# Patient Record
Sex: FEMALE | HISPANIC OR LATINO
[De-identification: names, ages, dates, MRNs, and addresses within clinical notes are randomized per-mention and may not be internally consistent; named-entity substitution may affect disease eponyms.]

---

## 2017-07-24 ENCOUNTER — RESULT REVIEW (OUTPATIENT)
Age: 27
End: 2017-07-24

## 2018-07-06 ENCOUNTER — OUTPATIENT (OUTPATIENT)
Dept: OUTPATIENT SERVICES | Facility: HOSPITAL | Age: 28
LOS: 1 days | Discharge: HOME | End: 2018-07-06

## 2018-07-06 DIAGNOSIS — Z79.899 OTHER LONG TERM (CURRENT) DRUG THERAPY: ICD-10-CM

## 2019-04-01 ENCOUNTER — EMERGENCY (EMERGENCY)
Facility: HOSPITAL | Age: 29
LOS: 0 days | Discharge: HOME | End: 2019-04-01
Attending: EMERGENCY MEDICINE | Admitting: EMERGENCY MEDICINE
Payer: MEDICAID

## 2019-04-01 VITALS
HEART RATE: 96 BPM | DIASTOLIC BLOOD PRESSURE: 66 MMHG | TEMPERATURE: 98 F | RESPIRATION RATE: 16 BRPM | SYSTOLIC BLOOD PRESSURE: 119 MMHG | OXYGEN SATURATION: 99 %

## 2019-04-01 VITALS — HEIGHT: 64 IN | WEIGHT: 128.09 LBS

## 2019-04-01 DIAGNOSIS — K52.9 NONINFECTIVE GASTROENTERITIS AND COLITIS, UNSPECIFIED: ICD-10-CM

## 2019-04-01 DIAGNOSIS — R10.31 RIGHT LOWER QUADRANT PAIN: ICD-10-CM

## 2019-04-01 DIAGNOSIS — R19.7 DIARRHEA, UNSPECIFIED: ICD-10-CM

## 2019-04-01 LAB
ALBUMIN SERPL ELPH-MCNC: 4.2 G/DL — SIGNIFICANT CHANGE UP (ref 3.3–5)
ALP SERPL-CCNC: 88 U/L — SIGNIFICANT CHANGE UP (ref 40–120)
ALT FLD-CCNC: 30 U/L — SIGNIFICANT CHANGE UP (ref 12–78)
ANION GAP SERPL CALC-SCNC: 6 MMOL/L — SIGNIFICANT CHANGE UP (ref 5–17)
APPEARANCE UR: CLEAR — SIGNIFICANT CHANGE UP
AST SERPL-CCNC: 23 U/L — SIGNIFICANT CHANGE UP (ref 15–37)
BACTERIA # UR AUTO: ABNORMAL
BASOPHILS # BLD AUTO: 0.01 K/UL — SIGNIFICANT CHANGE UP (ref 0–0.2)
BASOPHILS NFR BLD AUTO: 0.1 % — SIGNIFICANT CHANGE UP (ref 0–2)
BILIRUB SERPL-MCNC: 0.5 MG/DL — SIGNIFICANT CHANGE UP (ref 0.2–1.2)
BILIRUB UR-MCNC: ABNORMAL
BUN SERPL-MCNC: 12 MG/DL — SIGNIFICANT CHANGE UP (ref 7–23)
CALCIUM SERPL-MCNC: 8.8 MG/DL — SIGNIFICANT CHANGE UP (ref 8.5–10.1)
CHLORIDE SERPL-SCNC: 107 MMOL/L — SIGNIFICANT CHANGE UP (ref 96–108)
CO2 SERPL-SCNC: 23 MMOL/L — SIGNIFICANT CHANGE UP (ref 22–31)
COLOR SPEC: YELLOW — SIGNIFICANT CHANGE UP
COMMENT - URINE: SIGNIFICANT CHANGE UP
CREAT SERPL-MCNC: 0.88 MG/DL — SIGNIFICANT CHANGE UP (ref 0.5–1.3)
DIFF PNL FLD: ABNORMAL
EOSINOPHIL # BLD AUTO: 0.08 K/UL — SIGNIFICANT CHANGE UP (ref 0–0.5)
EOSINOPHIL NFR BLD AUTO: 1 % — SIGNIFICANT CHANGE UP (ref 0–6)
EPI CELLS # UR: SIGNIFICANT CHANGE UP
GLUCOSE SERPL-MCNC: 122 MG/DL — HIGH (ref 70–99)
GLUCOSE UR QL: NEGATIVE MG/DL — SIGNIFICANT CHANGE UP
HCT VFR BLD CALC: 47.8 % — HIGH (ref 34.5–45)
HGB BLD-MCNC: 15.7 G/DL — HIGH (ref 11.5–15.5)
HYALINE CASTS # UR AUTO: ABNORMAL /LPF
IMM GRANULOCYTES NFR BLD AUTO: 0.2 % — SIGNIFICANT CHANGE UP (ref 0–1.5)
KETONES UR-MCNC: ABNORMAL
LEUKOCYTE ESTERASE UR-ACNC: ABNORMAL
LYMPHOCYTES # BLD AUTO: 0.59 K/UL — LOW (ref 1–3.3)
LYMPHOCYTES # BLD AUTO: 7.1 % — LOW (ref 13–44)
MCHC RBC-ENTMCNC: 30.7 PG — SIGNIFICANT CHANGE UP (ref 27–34)
MCHC RBC-ENTMCNC: 32.8 GM/DL — SIGNIFICANT CHANGE UP (ref 32–36)
MCV RBC AUTO: 93.4 FL — SIGNIFICANT CHANGE UP (ref 80–100)
MONOCYTES # BLD AUTO: 0.6 K/UL — SIGNIFICANT CHANGE UP (ref 0–0.9)
MONOCYTES NFR BLD AUTO: 7.2 % — SIGNIFICANT CHANGE UP (ref 2–14)
NEUTROPHILS # BLD AUTO: 7 K/UL — SIGNIFICANT CHANGE UP (ref 1.8–7.4)
NEUTROPHILS NFR BLD AUTO: 84.4 % — HIGH (ref 43–77)
NITRITE UR-MCNC: NEGATIVE — SIGNIFICANT CHANGE UP
NRBC # BLD: 0 /100 WBCS — SIGNIFICANT CHANGE UP (ref 0–0)
PH UR: 6 — SIGNIFICANT CHANGE UP (ref 5–8)
PLATELET # BLD AUTO: 208 K/UL — SIGNIFICANT CHANGE UP (ref 150–400)
POTASSIUM SERPL-MCNC: 3.6 MMOL/L — SIGNIFICANT CHANGE UP (ref 3.5–5.3)
POTASSIUM SERPL-SCNC: 3.6 MMOL/L — SIGNIFICANT CHANGE UP (ref 3.5–5.3)
PROT SERPL-MCNC: 7.8 GM/DL — SIGNIFICANT CHANGE UP (ref 6–8.3)
PROT UR-MCNC: 30 MG/DL
RBC # BLD: 5.12 M/UL — SIGNIFICANT CHANGE UP (ref 3.8–5.2)
RBC # FLD: 12.7 % — SIGNIFICANT CHANGE UP (ref 10.3–14.5)
RBC CASTS # UR COMP ASSIST: SIGNIFICANT CHANGE UP /HPF (ref 0–4)
SODIUM SERPL-SCNC: 136 MMOL/L — SIGNIFICANT CHANGE UP (ref 135–145)
SP GR SPEC: 1.02 — SIGNIFICANT CHANGE UP (ref 1.01–1.02)
UROBILINOGEN FLD QL: 1 MG/DL
WBC # BLD: 8.3 K/UL — SIGNIFICANT CHANGE UP (ref 3.8–10.5)
WBC # FLD AUTO: 8.3 K/UL — SIGNIFICANT CHANGE UP (ref 3.8–10.5)
WBC UR QL: SIGNIFICANT CHANGE UP

## 2019-04-01 PROCEDURE — 74177 CT ABD & PELVIS W/CONTRAST: CPT | Mod: 26

## 2019-04-01 PROCEDURE — 99284 EMERGENCY DEPT VISIT MOD MDM: CPT

## 2019-04-01 RX ORDER — KETOROLAC TROMETHAMINE 30 MG/ML
30 SYRINGE (ML) INJECTION ONCE
Qty: 0 | Refills: 0 | Status: DISCONTINUED | OUTPATIENT
Start: 2019-04-01 | End: 2019-04-01

## 2019-04-01 RX ORDER — SODIUM CHLORIDE 9 MG/ML
1000 INJECTION INTRAMUSCULAR; INTRAVENOUS; SUBCUTANEOUS ONCE
Qty: 0 | Refills: 0 | Status: COMPLETED | OUTPATIENT
Start: 2019-04-01 | End: 2019-04-01

## 2019-04-01 RX ORDER — SODIUM CHLORIDE 9 MG/ML
3 INJECTION INTRAMUSCULAR; INTRAVENOUS; SUBCUTANEOUS ONCE
Qty: 0 | Refills: 0 | Status: COMPLETED | OUTPATIENT
Start: 2019-04-01 | End: 2019-04-01

## 2019-04-01 RX ADMIN — SODIUM CHLORIDE 3 MILLILITER(S): 9 INJECTION INTRAMUSCULAR; INTRAVENOUS; SUBCUTANEOUS at 13:07

## 2019-04-01 RX ADMIN — Medication 30 MILLIGRAM(S): at 13:06

## 2019-04-01 RX ADMIN — SODIUM CHLORIDE 1000 MILLILITER(S): 9 INJECTION INTRAMUSCULAR; INTRAVENOUS; SUBCUTANEOUS at 13:06

## 2019-04-01 NOTE — ED STATDOCS - OBJECTIVE STATEMENT
28 y/o female with no pertinent PMHx presents to the ED c/o intermittent RLQ abd pain, diarrhea that started yesterday. She states she has had her period for one month. she was unable to secure an appointment with a doctor until next month. Never smoker. NKDA. PMD- [facility].

## 2019-04-01 NOTE — ED STATDOCS - PROGRESS NOTE DETAILS
Patient seen and evaluated with ED attending at intake, ED attending note and orders reviewed, will continue with patient follow up and care -Katerin Urrutia PA-C  ID [id number].  Reviewed all results with patient.  +ilieitis with diarrhea.  Likely viral or food related.  She has not had this before.  REviewed symptom care at home as well as return precautions and GI f/u if no improvement.  Also discussed that patient has had menstrual bleeding on and off for about 1 month.  She does have an appointment with her doctor in May, she could not get in earlier.  No anemia on blood work.  ovaries and uterus are unremarkable on ct.  copy of results provided to patient.  -Katerin Urrutia PA-C

## 2019-04-01 NOTE — ED STATDOCS - CLINICAL SUMMARY MEDICAL DECISION MAKING FREE TEXT BOX
Plan labs, fluids, pain meds, and CT abd Plan labs, fluids, pain meds, and CT abd    ileitis on CT, likely infectious and the cause of her pain and diarrhea.  Reviewed symptom care at home and return precautions.

## 2019-04-01 NOTE — ED ADULT TRIAGE NOTE - CHIEF COMPLAINT QUOTE
patient reports subjective fever and right groin pain x 2 days, RLQ pain x 24 hours.  She states she has had her period for one month. she was unable to secure an appointment with a doctor until next month. denies N/V/D

## 2019-05-30 ENCOUNTER — RESULT REVIEW (OUTPATIENT)
Age: 29
End: 2019-05-30

## 2019-12-27 ENCOUNTER — TRANSCRIPTION ENCOUNTER (OUTPATIENT)
Age: 29
End: 2019-12-27

## 2020-07-03 ENCOUNTER — OUTPATIENT (OUTPATIENT)
Dept: OUTPATIENT SERVICES | Facility: HOSPITAL | Age: 30
LOS: 1 days | End: 2020-07-03
Payer: MEDICAID

## 2020-07-03 DIAGNOSIS — Z11.59 ENCOUNTER FOR SCREENING FOR OTHER VIRAL DISEASES: ICD-10-CM

## 2020-07-03 PROBLEM — Z78.9 OTHER SPECIFIED HEALTH STATUS: Chronic | Status: ACTIVE | Noted: 2019-04-01

## 2020-07-03 LAB — SARS-COV-2 RNA SPEC QL NAA+PROBE: SIGNIFICANT CHANGE UP

## 2020-07-03 PROCEDURE — U0003: CPT

## 2020-07-04 DIAGNOSIS — Z11.59 ENCOUNTER FOR SCREENING FOR OTHER VIRAL DISEASES: ICD-10-CM

## 2020-09-07 ENCOUNTER — RESULT REVIEW (OUTPATIENT)
Age: 30
End: 2020-09-07

## 2021-11-26 ENCOUNTER — RESULT REVIEW (OUTPATIENT)
Age: 31
End: 2021-11-26

## 2022-12-05 ENCOUNTER — RESULT REVIEW (OUTPATIENT)
Age: 32
End: 2022-12-05

## 2024-02-13 ENCOUNTER — NON-APPOINTMENT (OUTPATIENT)
Age: 34
End: 2024-02-13

## 2024-05-21 NOTE — ED STATDOCS - MUSCULOSKELETAL, MLM
Per CM, palliative consult no longer needed at this time and consult is being closed    Please re-consult palliative care if needed in the future   range of motion is not limited and there is no muscle tenderness.

## 2024-08-31 ENCOUNTER — EMERGENCY (EMERGENCY)
Facility: HOSPITAL | Age: 34
LOS: 0 days | Discharge: HOME | End: 2024-08-31
Attending: STUDENT IN AN ORGANIZED HEALTH CARE EDUCATION/TRAINING PROGRAM
Payer: MEDICAID

## 2024-08-31 VITALS
TEMPERATURE: 98 F | RESPIRATION RATE: 18 BRPM | DIASTOLIC BLOOD PRESSURE: 64 MMHG | HEART RATE: 66 BPM | SYSTOLIC BLOOD PRESSURE: 99 MMHG | OXYGEN SATURATION: 97 %

## 2024-08-31 VITALS
TEMPERATURE: 99 F | SYSTOLIC BLOOD PRESSURE: 106 MMHG | RESPIRATION RATE: 18 BRPM | WEIGHT: 166.67 LBS | HEART RATE: 87 BPM | DIASTOLIC BLOOD PRESSURE: 73 MMHG | OXYGEN SATURATION: 98 %

## 2024-08-31 DIAGNOSIS — R05.9 COUGH, UNSPECIFIED: ICD-10-CM

## 2024-08-31 DIAGNOSIS — Z20.822 CONTACT WITH AND (SUSPECTED) EXPOSURE TO COVID-19: ICD-10-CM

## 2024-08-31 DIAGNOSIS — R09.81 NASAL CONGESTION: ICD-10-CM

## 2024-08-31 DIAGNOSIS — B34.9 VIRAL INFECTION, UNSPECIFIED: ICD-10-CM

## 2024-08-31 DIAGNOSIS — Z97.5 PRESENCE OF (INTRAUTERINE) CONTRACEPTIVE DEVICE: ICD-10-CM

## 2024-08-31 LAB
FLUAV AG NPH QL: SIGNIFICANT CHANGE UP
FLUBV AG NPH QL: SIGNIFICANT CHANGE UP
RSV RNA NPH QL NAA+NON-PROBE: SIGNIFICANT CHANGE UP
S PYO AG SPEC QL IA: NEGATIVE — SIGNIFICANT CHANGE UP
SARS-COV-2 RNA SPEC QL NAA+PROBE: SIGNIFICANT CHANGE UP

## 2024-08-31 PROCEDURE — 93010 ELECTROCARDIOGRAM REPORT: CPT

## 2024-08-31 PROCEDURE — 99285 EMERGENCY DEPT VISIT HI MDM: CPT | Mod: 25

## 2024-08-31 PROCEDURE — 87880 STREP A ASSAY W/OPTIC: CPT

## 2024-08-31 PROCEDURE — 71046 X-RAY EXAM CHEST 2 VIEWS: CPT

## 2024-08-31 PROCEDURE — 87081 CULTURE SCREEN ONLY: CPT

## 2024-08-31 PROCEDURE — 0241U: CPT

## 2024-08-31 PROCEDURE — 99284 EMERGENCY DEPT VISIT MOD MDM: CPT

## 2024-08-31 PROCEDURE — 71046 X-RAY EXAM CHEST 2 VIEWS: CPT | Mod: 26

## 2024-08-31 PROCEDURE — 93005 ELECTROCARDIOGRAM TRACING: CPT

## 2024-08-31 RX ORDER — ACETAMINOPHEN 500 MG/5ML
650 LIQUID (ML) ORAL ONCE
Refills: 0 | Status: COMPLETED | OUTPATIENT
Start: 2024-08-31 | End: 2024-08-31

## 2024-08-31 RX ORDER — PREDNISONE 20 MG/1
60 TABLET ORAL ONCE
Refills: 0 | Status: DISCONTINUED | OUTPATIENT
Start: 2024-08-31 | End: 2024-08-31

## 2024-08-31 RX ADMIN — Medication 650 MILLIGRAM(S): at 20:28

## 2024-09-02 LAB
CULTURE RESULTS: SIGNIFICANT CHANGE UP
SPECIMEN SOURCE: SIGNIFICANT CHANGE UP

## 2024-12-20 ENCOUNTER — APPOINTMENT (OUTPATIENT)
Age: 34
End: 2024-12-20
Payer: COMMERCIAL

## 2024-12-20 PROCEDURE — D9310: CPT

## 2025-03-07 ENCOUNTER — APPOINTMENT (OUTPATIENT)
Age: 35
End: 2025-03-07
Payer: COMMERCIAL

## 2025-03-07 PROCEDURE — D7210: CPT

## 2025-03-17 ENCOUNTER — APPOINTMENT (OUTPATIENT)
Age: 35
End: 2025-03-17

## 2025-06-01 ENCOUNTER — EMERGENCY (EMERGENCY)
Facility: HOSPITAL | Age: 35
LOS: 0 days | Discharge: HOME | End: 2025-06-01
Attending: EMERGENCY MEDICINE
Payer: MEDICAID

## 2025-06-01 VITALS
OXYGEN SATURATION: 100 % | HEART RATE: 68 BPM | DIASTOLIC BLOOD PRESSURE: 78 MMHG | TEMPERATURE: 98 F | SYSTOLIC BLOOD PRESSURE: 119 MMHG | RESPIRATION RATE: 18 BRPM

## 2025-06-01 VITALS
RESPIRATION RATE: 20 BRPM | SYSTOLIC BLOOD PRESSURE: 176 MMHG | OXYGEN SATURATION: 100 % | DIASTOLIC BLOOD PRESSURE: 83 MMHG | HEART RATE: 97 BPM | TEMPERATURE: 98 F

## 2025-06-01 DIAGNOSIS — L29.9 PRURITUS, UNSPECIFIED: ICD-10-CM

## 2025-06-01 DIAGNOSIS — L50.9 URTICARIA, UNSPECIFIED: ICD-10-CM

## 2025-06-01 PROCEDURE — 99284 EMERGENCY DEPT VISIT MOD MDM: CPT

## 2025-06-01 PROCEDURE — 99283 EMERGENCY DEPT VISIT LOW MDM: CPT

## 2025-06-01 RX ORDER — PREDNISONE 20 MG/1
1 TABLET ORAL
Qty: 5 | Refills: 0
Start: 2025-06-01 | End: 2025-06-05

## 2025-06-01 RX ORDER — PREDNISONE 20 MG/1
50 TABLET ORAL ONCE
Refills: 0 | Status: COMPLETED | OUTPATIENT
Start: 2025-06-01 | End: 2025-06-01

## 2025-06-01 RX ADMIN — PREDNISONE 50 MILLIGRAM(S): 20 TABLET ORAL at 23:29

## 2025-06-01 RX ADMIN — Medication 10 MILLIGRAM(S): at 23:30

## 2025-06-01 RX ADMIN — Medication 20 MILLIGRAM(S): at 23:29

## 2025-06-01 NOTE — ED STATDOCS - NSFOLLOWUPINSTRUCTIONS_ED_ALL_ED_FT
Ronchas  Hives  Las ronchas son zonas enrojecidas e hinchadas en la piel que ocasionan picazón. Pueden aparecer en cualquier parte del cuerpo. En general, desaparecen en el transcurso de 24 horas (ronchas agudas). Si tiene ronchas nuevas después de que las [place] desaparecen y que lombardi muchos días o semanas, se denominan ronchas crónicas. No se transmiten de persona a persona (no son contagiosas).    Las ronchas pueden ocurrir cuando el cuerpo reacciona a algo a lo que usted es alérgico (alérgeno). A veces se los llama factores desencadenantes. Puede tener ronchas inmediatamente después de estar cerca de un factor desencadenante u horas más tarde.    ¿Cuáles son las causas?  Alergias a los alimentos.  Picaduras o mordeduras de insectos.  Alergias al polen o a las mascotas.  Exponerse a la bertin del sol, al calor o al frío.  Actividad física.  Estrés.  Otras causas tales saroj:  Virus. [place] incluye el resfrío común.  Las infecciones causadas por gérmenes (bacterias).  Algunos medicamentos.  Productos químicos o látex.  Vacunas contra la alergia.  Transfusiones de ivett.  En algunos casos, se desconoce la causa.    ¿Qué incrementa el riesgo?  Ser jay.  Ser alérgico a alimentos tales saroj:  Frutas cítricas.  Leche.  Huevos.  Maníes.  Ying secos.  Mariscos.  Ser alérgico a:  Medicamentos.  Látex.  Insectos.  Animales.  Polen.  ¿Cuáles son los signos o síntomas?  A red rash on a person's upper arm.  Protuberancias o manchas en la piel, de color kate o fraser y que producen picazón. Estas zonas pueden:  Hincharse o agrandarse.  Cambiar de forma y ubicación.  Aparecer solas o conectarse entre sí en jean carlos gran área de piel.  Causar escozor o doler.  Volverse kalyan (palidecer) al presionar en el centro.  En casos muy graves, las rigoberto, los pies y la luke también pueden hincharse. [place] puede ocurrir si las ronchas comienzan en las capas profundas de la piel.    ¿Cómo se trata?  El tratamiento para las ronchas depende de los síntomas. Es posible que deba hacer lo siguiente:  Usar paños fríos y húmedos (compresas frías) o niles duchas con agua fría para detener la picazón.  Usar o aplicar medicamentos para lo siguiente:  Ayudar a aliviar la picazón (antihistamínicos).  Disminuir la hinchazón (corticoesteroides).  Tratar la infección (antibióticos).  Recibir un medicamento llamado omalizumab en forma de inyección. Puede necesitar esto si las ronchas no mejoran con otros tratamientos.  En los casos muy graves, es posible que deba usar un dispositivo lleno de medicamento que administra jean carlos inyección de emergencia de epinefrina (lápiz autoinyector) para detener jean carlos reacción alérgica muy grave (reacción anafiláctica).  Siga estas indicaciones en louis casa:  Medicamentos    Use o aplique los medicamentos de venta meghan y los recetados solamente saroj se lo haya indicado el médico.  Si le recetaron antibióticos, tómelos saroj se lo haya indicado el médico. No deje de tomarlos aunque comience a sentirse mejor.  Cuidado de la piel    Aplique paños fríos y húmedos en las ronchas.  No se rasque la piel. No se frote la piel.  Indicaciones generales    No se duche ni tome lynn de inmersión con [place]. Podría empeorar la picazón.  No use ropa ajustada.  Use pantalla solar. Use ropas que le cubran la piel cuando esté al aire meghan.  Evite los factores desencadenantes que le causan las ronchas. Lleve un registro para realizar un seguimiento de aquello que le causa ronchas. Escriba los siguientes datos:  Los medicamentos que sana.  Lo que usted come y marian.  Lo que se pone en la piel.  Concurra a todas las visitas de seguimiento. El médico deberá asegurarse de que el tratamiento esté funcionando.  Comuníquese con un médico si:  Los síntomas no mejoran con los medicamentos.  Las articulaciones le duelen o se hinchan.  Tiene fiebre.  Siente dolor en el vientre (abdomen).  Solicite ayuda de inmediato si:  La lengua o los labios se le hinchan.  Tiene los párpados hinchados.  Siente el pecho o la garganta cerrados.  Tiene problemas para respirar o tragar.  Estos síntomas pueden indicar jean carlos emergencia. Solicite ayuda de inmediato. Llame al 911.  No espere a alice si los síntomas desaparecen.  No conduzca por viktor propios medios hasta el hospital.  Esta información no tiene saroj fin reemplazar el consejo del médico. Asegúrese de hacerle al médico cualquier pregunta que tenga.    Document Revised: 10/18/2023 Document Reviewed: 10/18/2023  Elsevier Patient Education © 2025 Elsevier Inc.  Elsevier logo  Terms and Conditions  Privacy Policy  Editorial Policy  All content on this site: Copyright © 2025 Elsevier, its licensors, and contributors. All rights are reserved, including those for text and data mining, AI training, and similar technologies. For all open access content, the Creative Commons licensing terms apply.  Cookies are used by this site. To decline or learn more, visit our Cookies page.

## 2025-06-01 NOTE — ED ADULT NURSE NOTE - OBJECTIVE STATEMENT
Pt presents to the ER with c/o rash. Pt reports noticing a rash yesterday endorsing burning sensation, denies itchiness. Pt endorsing tingling in her throat, denies SOB at this time. No acute distress noted, Pt presents to the ER with c/o rash. Pt reports noticing a rash yesterday endorsing burning sensation, denies itchiness. Pt endorsing tingling in her throat, denies SOB at this time. No acute distress noted.

## 2025-06-01 NOTE — ED STATDOCS - PHYSICAL EXAMINATION
GEN: no acute distress  HEENT: Normocephalic. Atraumatic. No oral or lingual swelling, normal speech.  RESP: No resp distress. No accessory muscle use. Speaking in full sentences. No wheezing, no stridor.  SKIN: mild diffuse hives  MUSC: FROM to all extremities without gait disturbance.  EXT: No edema, contractures or noted deformities.  NEURO: Alert and oriented. Speech clear. No overt focal findings.  PSYCH: Normal mood and affect.

## 2025-06-01 NOTE — ED STATDOCS - CLINICAL SUMMARY MEDICAL DECISION MAKING FREE TEXT BOX
Patient with mild diffuse hives.  No signs of anaphylaxis, no oral swelling, no wheezing, no stridor.  Will treat with antihistamines, prednisone.  Will send prednisone to the pharmacy.  Uncertain etiology, will have patient follow-up with her PCP.  Strict return precautions given for any worsening.  Patient verbalized understanding and agrees plan.

## 2025-06-01 NOTE — ED ADULT TRIAGE NOTE - CHIEF COMPLAINT QUOTE
Pt A&OX3, presenting to the ER with c/o rash. Pt reports yesterday started with a rash that has a burning sensation. Her body is swelling and is itchy. She had difficulty breathing yesterday. She took medication for allergies but it came back. Today the rash came back, she fells like she is having difficulty breathing and tightness in her throat.   No known allergies.   Pt denies facial swelling, tongue swelling. Patient talking in full sentences in triage. No visible signs of distress noted at this time.

## 2025-06-01 NOTE — ED STATDOCS - OBJECTIVE STATEMENT
35-year-old female who denies past medical history, who presents with chief complaint of rash.  Patient states that symptoms have started since yesterday.  Complains of burning/itching sensation, hives.  To nurse she stated that she had difficulty breathing yesterday, however none today.  She denies fever, facial swelling, tongue swelling, or any other symptoms.  Patient taken over-the-counter antihistamine with only minor improvement.  No other concerns at this time.

## 2025-06-01 NOTE — ED STATDOCS - PATIENT PORTAL LINK FT
You can access the FollowMyHealth Patient Portal offered by [facility] by registering at the following website: [email or web address]. By joining [facility]’s FollowMyHealth portal, you will also be able to view your health information using other applications (apps) compatible with our system.